# Patient Record
Sex: MALE
[De-identification: names, ages, dates, MRNs, and addresses within clinical notes are randomized per-mention and may not be internally consistent; named-entity substitution may affect disease eponyms.]

---

## 2022-08-07 ENCOUNTER — NON-APPOINTMENT (OUTPATIENT)
Age: 32
End: 2022-08-07

## 2022-08-10 PROBLEM — Z00.00 ENCOUNTER FOR PREVENTIVE HEALTH EXAMINATION: Status: ACTIVE | Noted: 2022-08-10

## 2022-08-17 ENCOUNTER — APPOINTMENT (OUTPATIENT)
Dept: UROLOGY | Facility: CLINIC | Age: 32
End: 2022-08-17

## 2022-08-18 ENCOUNTER — APPOINTMENT (OUTPATIENT)
Dept: UROLOGY | Facility: CLINIC | Age: 32
End: 2022-08-18

## 2022-08-18 DIAGNOSIS — N39.0 URINARY TRACT INFECTION, SITE NOT SPECIFIED: ICD-10-CM

## 2022-08-18 DIAGNOSIS — R10.9 UNSPECIFIED ABDOMINAL PAIN: ICD-10-CM

## 2022-08-18 DIAGNOSIS — A49.9 URINARY TRACT INFECTION, SITE NOT SPECIFIED: ICD-10-CM

## 2022-08-18 DIAGNOSIS — Z87.891 PERSONAL HISTORY OF NICOTINE DEPENDENCE: ICD-10-CM

## 2022-08-18 PROCEDURE — 99204 OFFICE O/P NEW MOD 45 MIN: CPT

## 2022-08-18 RX ORDER — CIPROFLOXACIN HYDROCHLORIDE 500 MG/1
500 TABLET, FILM COATED ORAL
Qty: 14 | Refills: 0 | Status: ACTIVE | COMMUNITY
Start: 2022-08-18 | End: 1900-01-01

## 2022-08-18 NOTE — PHYSICAL EXAM
[Abdomen Soft] : soft [Abdomen Tenderness] : non-tender [] : no hepato-splenomegaly [Abdomen Mass (___ Cm)] : no abdominal mass palpated [Abdomen Hernia] : no hernia was discovered [Urethral Meatus] : meatus normal [Penis Abnormality] : normal circumcised penis [Epididymis] : the epididymides were normal [Testes Tenderness] : no tenderness of the testes [FreeTextEntry1] : tender prostate

## 2022-08-18 NOTE — ASSESSMENT
[FreeTextEntry1] : \par Mr. Edwin Saavedra is a 32-year-old black gentleman who presents today having been seen at [facility] in [place] for symptoms of a UTI.  His urine culture demonstrated E. coli he had been treated with Septra. \par > 100K E.Coli\par R TMP/Sx and Ampcillin\par  The culture however demonstrates this to be a resistant E. coli not sensitive to Septra or ampicillin.  Because of this he will be treated with Cipro.  We discussed the risks and complications of Cipro and warned him regarding signs of tendinitis etc.\par \par He also complains of right flank pain.  He states this started 2 days ago.  Is not clear if this is CVA tenderness or musculoskeletal pain.  However he notes that this is constant.  In light of his hematuria we will proceed with a CT scan to make sure that he does not have a kidney stone.\par \par The patient denies any fevers chills etc.  He was warned to notify me if he develops a fever or chills or increasing flank pain.\par Plan:\par 1.  Urine culture\par 2.  CT urogram\par > 100K E.Coli\par R TMP/Sx and Ampcillinh\par

## 2022-08-18 NOTE — HISTORY OF PRESENT ILLNESS
[None] : no symptoms [FreeTextEntry1] : 32 year old black male\par [employer]  and \par seen in Urgent Care for back pain and dysuria x 8 days\par complaining of terminal hematuria\par treated with antibiotics x 7 (TMP/SX)\par patient continues to complain of suprapubic and perineal pain\par no further dysuria\par no fevers no chills [Erectile Dysfunction] : no Erectile Dysfunction

## 2022-08-22 LAB — BACTERIA UR CULT: ABNORMAL

## 2022-08-26 ENCOUNTER — APPOINTMENT (OUTPATIENT)
Dept: CT IMAGING | Facility: CLINIC | Age: 32
End: 2022-08-26

## 2022-08-26 ENCOUNTER — OUTPATIENT (OUTPATIENT)
Dept: OUTPATIENT SERVICES | Facility: HOSPITAL | Age: 32
LOS: 1 days | End: 2022-08-26

## 2022-08-26 PROCEDURE — 74178 CT ABD&PLV WO CNTR FLWD CNTR: CPT | Mod: 26

## 2022-08-29 ENCOUNTER — NON-APPOINTMENT (OUTPATIENT)
Age: 32
End: 2022-08-29

## 2022-09-15 ENCOUNTER — APPOINTMENT (OUTPATIENT)
Dept: UROLOGY | Facility: CLINIC | Age: 32
End: 2022-09-15